# Patient Record
Sex: MALE | HISPANIC OR LATINO | URBAN - METROPOLITAN AREA
[De-identification: names, ages, dates, MRNs, and addresses within clinical notes are randomized per-mention and may not be internally consistent; named-entity substitution may affect disease eponyms.]

---

## 2021-01-15 DIAGNOSIS — Z23 NEED FOR VACCINATION: ICD-10-CM

## 2024-01-16 ENCOUNTER — NON-PROVIDER VISIT (OUTPATIENT)
Dept: NEUROLOGY | Facility: MEDICAL CENTER | Age: 74
End: 2024-01-16
Attending: SPECIALIST
Payer: COMMERCIAL

## 2024-01-16 DIAGNOSIS — R20.2 TINGLING: ICD-10-CM

## 2024-01-16 PROCEDURE — 95908 NRV CNDJ TST 3-4 STUDIES: CPT | Performed by: SPECIALIST

## 2024-01-16 PROCEDURE — 95886 MUSC TEST DONE W/N TEST COMP: CPT | Mod: 26 | Performed by: SPECIALIST

## 2024-01-16 PROCEDURE — 95908 NRV CNDJ TST 3-4 STUDIES: CPT | Mod: 26 | Performed by: SPECIALIST

## 2024-01-16 PROCEDURE — 95886 MUSC TEST DONE W/N TEST COMP: CPT | Performed by: SPECIALIST

## 2024-01-16 NOTE — PROCEDURES
NERVE CONDUCTION STUDIES AND ELECTROMYOGRAPHY REPORT        01/16/24      Referring provider: Pcp Pt States None      SUMMARY OF PATIENT'S CLINICAL HISTORY,PHYSICAL EXAM, AND RATIONALE FOR TESTING:    Mr. Flako Lorenzo 73 y.o. presenting with numbness in the left hand.    Past Medical History is significant for :   Past Medical History:   Diagnosis Date    Diabetes     Hypertension        The electrodiagnostic studies were performed to evaluate for possible peripheral neuropathy versus radiculopathy.      ELECTRODIAGNOSTIC EXAMINATION:  Nerve conduction studies (NCS) and electromyography (EMG) are utilized to evaluate direct or indirect damage to the peripheral nervous system. NCS are performed to measure the nerve(s) response(s) to electrostimulation across a given nerve segment. EMG evaluates the passive and active electrical activity of the muscle(s) in question.  Muscles are innervated by specific peripheral nerves and roots. Often times, several nerves the muscle to be examined in order to determine the presence or absence of the disease process. Furthermore, nerves and muscles may need to be tested in a [id number] comparison, as well as in additional extremities, as this may be crucial in characterizing the extent of the disease process, which may be diffuse or isolated and of varying degree of severity. The extent of the neurodiagnostic exam is justified as it may help arrive to a proper diagnosis, which ultimately may contribute to better management of the patient. Therefore, the nerves to muscles examined during the study were medically necessary.    Unless otherwise noted, temperature of the extremity(s) study was monitored before and during the examination and remained between 32 and 36 degrees C for the upper extremities, and between 30 and 36 degrees C for the lower extremities.      NERVE CONDUCTION STUDIES:  Sensory nerves:   -Left median sensory nerve was examined.The response was within normal  limits.  -Left ulnar sensory nerve was examined. The response was within normal limits.    Motor nerves:   -Left median motor nerve was examined. Recording electrodes placed at the Abductor Pollicis Brevis muscles. The response was abnormal, onset latency was prolonged at 5.3 ms, amplitude and conduction velocity were within normal limits.  -Left ulnar motor nerve was examined. Recording electrodes placed at the Abductor Digiti Minimi muscles. The response was within normal limits.      ELECTROMYOGRAPHY:  The study was performed the concentric needle electrode. Fibrillation and fasciculation activity is graded by convention from none (0) to continuous (4+).  Needle electrode examination was performed in the following muscles: Left deltoid, biceps, triceps, first dorsal interosseous, abductor pollicis brevis.  The muscles tested demonstrated normal insertional activity, normal motor unit morphology and recruitment. There were no elements suggestive of active denervation.        Nerve Conduction Studies     Stim Site NR Onset (ms) Norm Onset (ms) O-P Amp (µV) Norm O-P Amp Site1 Site2 Delta-P (ms) Dist (cm) Abe (m/s) Norm Abe (m/s)   Left Median Anti Sensory (2nd Digit)   Wrist    3.8 <3.8 10.7 >10 Wrist 2nd Digit 5.3 14.0 *26 >50   Left Ulnar Anti Sensory (5th Digit)   Wrist    2.2 <3.2 12.2 >5 Wrist 5th Digit 2.9 14.0 *48 >50        Stim Site NR Onset (ms) Norm Onset (ms) O-P Amp (mV) Norm O-P Amp Site1 Site2 Delta-0 (ms) Dist (cm) Abe (m/s) Norm Abe (m/s)   Left Median Motor (Abd Poll Brev)   Wrist    *5.3 <4 6.4 >5 Elbow Wrist 4.2 25.0 60 >50   Elbow    9.5  4.7          Left Ulnar Motor (Abd Dig Min)   Wrist    2.9 <3.1 8.9 >7 B Elbow Wrist 4.2 22.0 52 >50   B Elbow    7.1  6.4  A Elbow B Elbow 1.5 10.0 67    A Elbow    8.6  6.1                        Electromyography     Side Muscle Nerve Root Ins Act Fibs Psw Amp Dur Poly Recrt Int Pat Comment   Left Deltoid Axillary C5-6 Nml Nml Nml Nml Nml 0 Nml Nml    Left  Biceps Musculocut C5-6 Nml Nml Nml Nml Nml 0 Nml Nml    Left Triceps Radial C6-7-8 Nml Nml Nml Nml Nml 0 Nml Nml    Left 1stDorInt Ulnar C8-T1 Nml Nml Nml Nml Nml 0 Nml Nml    Left Abd Poll Brev Median C8-T1 Nml Nml Nml Nml Nml 0 Nml Nml              DIAGNOSTIC INTERPRETATION:   Extensive electrodiagnostic studies were performed to the upper extremity.  The results are as follows:    1.  Left carpal tunnel syndrome which is of moderate severity electrophysiologically and not associated with motor unit changes in median nerve supplied hand muscles.    2.  Normal left ulnar nerve motor and sensory conduction studies.    3.  No evidence of radiculopathy in selected muscles studied left upper extremity.          TIA Escalera M.D.(No note.)